# Patient Record
Sex: MALE | Race: WHITE | NOT HISPANIC OR LATINO | URBAN - METROPOLITAN AREA
[De-identification: names, ages, dates, MRNs, and addresses within clinical notes are randomized per-mention and may not be internally consistent; named-entity substitution may affect disease eponyms.]

---

## 2024-03-19 ENCOUNTER — EMERGENCY (EMERGENCY)
Facility: HOSPITAL | Age: 74
LOS: 1 days | Discharge: HOME | End: 2024-03-19
Attending: EMERGENCY MEDICINE | Admitting: EMERGENCY MEDICINE
Payer: COMMERCIAL

## 2024-03-19 VITALS
HEART RATE: 65 BPM | WEIGHT: 190.04 LBS | SYSTOLIC BLOOD PRESSURE: 111 MMHG | DIASTOLIC BLOOD PRESSURE: 60 MMHG | OXYGEN SATURATION: 96 % | TEMPERATURE: 98 F | HEIGHT: 64 IN | RESPIRATION RATE: 18 BRPM

## 2024-03-19 PROCEDURE — 99284 EMERGENCY DEPT VISIT MOD MDM: CPT

## 2024-03-19 PROCEDURE — 96372 THER/PROPH/DIAG INJ SC/IM: CPT

## 2024-03-19 PROCEDURE — 73502 X-RAY EXAM HIP UNI 2-3 VIEWS: CPT | Mod: 26,RT

## 2024-03-19 PROCEDURE — 73502 X-RAY EXAM HIP UNI 2-3 VIEWS: CPT

## 2024-03-19 RX ORDER — IBUPROFEN 200 MG
1 TABLET ORAL
Qty: 21 | Refills: 0
Start: 2024-03-19 | End: 2024-03-25

## 2024-03-19 RX ORDER — ERYTHROMYCIN BASE 5 MG/GRAM
1 OINTMENT (GRAM) OPHTHALMIC (EYE)
Qty: 1 | Refills: 1
Start: 2024-03-19 | End: 2024-04-01

## 2024-03-19 RX ORDER — METHOCARBAMOL 500 MG/1
750 TABLET, FILM COATED ORAL ONCE
Refills: 0 | Status: COMPLETED | OUTPATIENT
Start: 2024-03-19 | End: 2024-03-19

## 2024-03-19 RX ORDER — KETOROLAC TROMETHAMINE 30 MG/ML
30 SYRINGE (ML) INJECTION ONCE
Refills: 0 | Status: DISCONTINUED | OUTPATIENT
Start: 2024-03-19 | End: 2024-03-19

## 2024-03-19 RX ORDER — LIDOCAINE 4 G/100G
1 CREAM TOPICAL ONCE
Refills: 0 | Status: COMPLETED | OUTPATIENT
Start: 2024-03-19 | End: 2024-03-19

## 2024-03-19 RX ORDER — ERYTHROMYCIN BASE 5 MG/GRAM
1 OINTMENT (GRAM) OPHTHALMIC (EYE) ONCE
Refills: 0 | Status: COMPLETED | OUTPATIENT
Start: 2024-03-19 | End: 2024-03-19

## 2024-03-19 RX ORDER — LIDOCAINE 4 G/100G
1 CREAM TOPICAL
Qty: 3 | Refills: 0
Start: 2024-03-19 | End: 2024-03-23

## 2024-03-19 RX ORDER — METHOCARBAMOL 500 MG/1
1 TABLET, FILM COATED ORAL
Qty: 15 | Refills: 0
Start: 2024-03-19 | End: 2024-03-23

## 2024-03-19 RX ADMIN — LIDOCAINE 1 PATCH: 4 CREAM TOPICAL at 18:43

## 2024-03-19 RX ADMIN — Medication 30 MILLIGRAM(S): at 18:42

## 2024-03-19 RX ADMIN — METHOCARBAMOL 750 MILLIGRAM(S): 500 TABLET, FILM COATED ORAL at 18:43

## 2024-03-19 RX ADMIN — Medication 1 APPLICATION(S): at 18:43

## 2024-03-19 NOTE — ED PROVIDER NOTE - OBJECTIVE STATEMENT
73-year-old male presents to the emergency department reporting right-sided buttock pain with radiation to the posterior thigh.  Atraumatic.  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  He states that with movement, the pain is worst.  Took Tylenol this morning and again at noon.  Patient states it is minimal relief.  He applied Bengay with minimal relief.  No abdominal pain.  No nausea vomiting.  Normal bowel movements.  No fevers or chills.

## 2024-03-19 NOTE — ED PROVIDER NOTE - CLINICAL SUMMARY MEDICAL DECISION MAKING FREE TEXT BOX
73-year-old male presents to the emergency department reporting right-sided buttock pain with radiation to the posterior thigh.  Atraumatic.  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  He states that with movement, the pain is worst.  Took Tylenol this morning and again at noon.  Patient states it is minimal relief.  He applied Bengay with minimal relief.  No abdominal pain.  No nausea vomiting.  Normal bowel movements.  No fevers or chills.    Since this is new onset pain for the patient, will obtain x-ray for screening purposes.  Will provide medications for pain.  Patient is driving.  Will give Robaxin ibuprofen and a lidocaine patch.  Will DC with medications and perhaps a stronger medication in case patient needs for home use.  Will give precautions to not drive if taking the stronger pain medication.  Also will advised to use erythromycin eye ointment and warm compresses to the right upper eyelid.      Prelim x-ray is normal.  All questions answered.

## 2024-03-19 NOTE — ED PROVIDER NOTE - NSFOLLOWUPINSTRUCTIONS_ED_ALL_ED_FT
In the Emergency Department today, we did not appreciate any abnormal findings on your xray. We will submit to our radiologist for FINAL review. If there are any new findings or concerning findings that were missed in the Emergency Department, we will notify you at the number provided upon registration.     Please take Medicines as prescribed.  Please take ibuprofen 600 mg every 6 hours as needed with food for pain.  Take acetaminophen 650 mg every 6 hours as needed for pain.  Apply lidocaine patch 4% to the area of pain.  You may change this patch every 8 hours as needed.  If pain is very severe and these medications are not helping, for his very severe pain, we have prescribed a stronger pain medication.  This is to be used only if needed for severe pain.  Please no driving or operating heavy machinery when taking this medication.  It is called Percocet also known as oxycodone with acetaminophen.  1 tablet every 6 hours as needed.  No more than 4 tablets in 24 hours.    Follow-up with your primary medical doctor within 1-2 days.  Please follow-up with an orthopedist as well.    Back pain is very common in adults. The cause of back pain is rarely dangerous and the pain often gets better over time. The cause of your back pain may not be known and may include strain of muscles or ligaments, degeneration of the spinal disks, or arthritis. Occasionally the pain may radiate down your leg(s). Over-the-counter medicines to reduce pain and inflammation are often the most helpful. Stretching and remaining active frequently helps the healing process.     SEEK IMMEDIATE MEDICAL CARE IF YOU HAVE ANY OF THE FOLLOWING SYMPTOMS: bowel or bladder control problems, unusual weakness or numbness in your arms or legs, nausea or vomiting, abdominal pain, fever, dizziness/lightheadedness.      Hoy en el Departamento de Emergencias, no apreciamos ningún hallazgo anormal en rivera radiografía. Lo enviaremos a nuestro radiólogo para ton revisión FINAL. Si hay algún hallazgo nuevo o algún hallazgo preocupante que se haya pasado por alto en el Departamento de Emergencias, se lo notificaremos al número proporcionado al registrarse.    [place] los medicamentos según lo recetado. [place] ibuprofeno 600 mg cada 6 horas según sea necesario con alimentos para el dolor. [place] acetaminofén 650 mg cada 6 horas según sea necesario para el dolor. Aplique un parche de lidocaína al 4% en el área del dolor. Puede cambiar mallika parche cada 8 horas según sea necesario. Si el dolor es muy intenso y estos medicamentos no le ayudan, para rivera dolor muy intenso le hemos recetado un analgésico más eleazar. [place] debe usarse sólo si es necesario para el dolor intenso. No conduzca ni opere maquinaria pesada mientras esté tomando mallika medicamento. Se llama Percocet, también conocido leanna oxicodona con paracetamol. 1 comprimido cada 6 horas según sea necesario. No más de 4 comprimidos en 24 horas.    [place] un seguimiento con rivera médico de cabecera dentro de 1 a 2 días. Por favor claudia un seguimiento también con un ortopedista.    El dolor de espalda es muy común en los adultos. La causa del dolor de espalda dougie vez es peligrosa y el dolor suele mejorar con el tiempo. Es posible que se desconozca la causa de rivera dolor de espalda y puede incluir distensión de músculos o ligamentos, degeneración de los discos espinales o artritis. Ocasionalmente, el dolor puede irradiarse hacia las piernas. Los medicamentos de venta demetrio para reducir el dolor y la inflamación suelen ser los más útiles. Estirarse y permanecer activo con frecuencia ayuda al proceso de curación.    BUSQUE ATENCIÓN MÉDICA INMEDIATA SI TIENE ALGUNO DE LOS SIGUIENTES SÍNTOMAS: problemas de control de los intestinos o la vejiga, debilidad inusual o entumecimiento en los brazos o las piernas, náuseas o vómitos, dolor abdominal, fiebre, mareos o aturdimiento. In the Emergency Department today, we did not appreciate any abnormal findings on your xray. We will submit to our radiologist for FINAL review. If there are any new findings or concerning findings that were missed in the Emergency Department, we will notify you at the number provided upon registration.     Please take Medicines as prescribed.  Please take ibuprofen 600 mg every 6 hours as needed with food for pain.  Take acetaminophen 650 mg every 6 hours as needed for pain.  Apply lidocaine patch 4% to the area of pain.  You may change this patch every 8 hours as needed.  If pain is very severe and these medications are not helping, for his very severe pain, we have prescribed a stronger pain medication.  This is to be used only if needed for severe pain.  Please no driving or operating heavy machinery when taking this medication.  It is called Percocet also known as oxycodone with acetaminophen.  1 tablet every 6 hours as needed.  No more than 4 tablets in 24 hours.    For the stye, we recommend using the eye ointment we provided.  Apply a small amount to the eyelid every 4 hours for at least 7 days.    Follow-up with your primary medical doctor within 1-2 days.  Please follow-up with an orthopedist as well.    Back pain is very common in adults. The cause of back pain is rarely dangerous and the pain often gets better over time. The cause of your back pain may not be known and may include strain of muscles or ligaments, degeneration of the spinal disks, or arthritis. Occasionally the pain may radiate down your leg(s). Over-the-counter medicines to reduce pain and inflammation are often the most helpful. Stretching and remaining active frequently helps the healing process.     SEEK IMMEDIATE MEDICAL CARE IF YOU HAVE ANY OF THE FOLLOWING SYMPTOMS: bowel or bladder control problems, unusual weakness or numbness in your arms or legs, nausea or vomiting, abdominal pain, fever, dizziness/lightheadedness.      Hoy en el Departamento de Emergencias, no apreciamos ningún hallazgo anormal en rivera radiografía. Lo enviaremos a nuestro radiólogo para ton revisión FINAL. Si hay algún hallazgo nuevo o algún hallazgo preocupante que se haya pasado por alto en el Departamento de Emergencias, se lo notificaremos al número proporcionado al registrarse.    [place] los medicamentos según lo recetado. [place] ibuprofeno 600 mg cada 6 horas según sea necesario con alimentos para el dolor. [place] acetaminofén 650 mg cada 6 horas según sea necesario para el dolor. Aplique un parche de lidocaína al 4% en el área del dolor. Puede cambiar mallika parche cada 8 horas según sea necesario. Si el dolor es muy intenso y estos medicamentos no le ayudan, para rivera dolor muy intenso le hemos recetado un analgésico más eleazar. [place] debe usarse sólo si es necesario para el dolor intenso. No conduzca ni opere maquinaria pesada mientras esté tomando mallika medicamento. Se llama Percocet, también conocido leanna oxicodona con paracetamol. 1 comprimido cada 6 horas según sea necesario. No más de 4 comprimidos en 24 horas.    [place] un seguimiento con rivera médico de cabecera dentro de 1 a 2 días. Por favor claudia un seguimiento también con un ortopedista.    El dolor de espalda es muy común en los adultos. La causa del dolor de espalda dougie vez es peligrosa y el dolor suele mejorar con el tiempo. Es posible que se desconozca la causa de rivera dolor de espalda y puede incluir distensión de músculos o ligamentos, degeneración de los discos espinales o artritis. Ocasionalmente, el dolor puede irradiarse hacia las piernas. Los medicamentos de venta demetrio para reducir el dolor y la inflamación suelen ser los más útiles. Estirarse y permanecer activo con frecuencia ayuda al proceso de curación.    Para el orzuelo, recomendamos utilizar la pomada para ojos que le proporcionamos. Aplicar ton pequeña cantidad en el párpado cada 4 horas noe al menos 7 días.    BUSQUE ATENCIÓN MÉDICA INMEDIATA SI TIENE ALGUNO DE LOS SIGUIENTES SÍNTOMAS: problemas de control de los intestinos o la vejiga, debilidad inusual o entumecimiento en los brazos o las piernas, náuseas o vómitos, dolor abdominal, fiebre, mareos o aturdimiento.

## 2024-03-19 NOTE — ED PROVIDER NOTE - PHYSICAL EXAMINATION
General:     NAD, well-nourished, well-appearing  Eyes: PERRL, white sclera, right upper eyelid stye. Small erythema to the lid. No drainage.   Abd:     +BS, s/nt/nd  Back: Right SI joint tenderness. ROM normal but exacerbates pain w R straight leg raise.   Ext:   no deformities   Skin: no rash  Neuro: AAOx3, no sensory/motor deficits

## 2024-03-19 NOTE — ED PROVIDER NOTE - CARE PROVIDER_API CALL
Ayan López Mao  Orthopaedic Surgery  [id number]  [place] [address]  Phone: [phone]  Fax: [phone]  Follow Up Time:

## 2024-03-19 NOTE — ED ADULT TRIAGE NOTE - CHIEF COMPLAINT QUOTE
Patient came from home with complaint of lower back pain for the past two weeks. Patient denies any recent falls or trauma.

## 2024-03-19 NOTE — ED PROVIDER NOTE - PATIENT PORTAL LINK FT
You can access the FollowMyHealth Patient Portal offered by [facility] by registering at the following website: [email or web address]. By joining [employer]’s FollowMyHealth portal, you will also be able to view your health information using other applications (apps) compatible with our system.

## 2025-03-18 DIAGNOSIS — M25.561 PAIN IN RIGHT KNEE: ICD-10-CM

## 2025-03-18 PROBLEM — Z00.00 ENCOUNTER FOR PREVENTIVE HEALTH EXAMINATION: Status: ACTIVE | Noted: 2025-03-18

## 2025-03-19 ENCOUNTER — APPOINTMENT (OUTPATIENT)
Dept: ORTHOPEDIC SURGERY | Facility: CLINIC | Age: 75
End: 2025-03-19
Payer: MEDICARE

## 2025-03-19 VITALS
HEIGHT: 64 IN | HEART RATE: 70 BPM | SYSTOLIC BLOOD PRESSURE: 150 MMHG | WEIGHT: 210 LBS | DIASTOLIC BLOOD PRESSURE: 84 MMHG | BODY MASS INDEX: 35.85 KG/M2

## 2025-03-19 DIAGNOSIS — M17.11 UNILATERAL PRIMARY OSTEOARTHRITIS, RIGHT KNEE: ICD-10-CM

## 2025-03-19 PROCEDURE — 73564 X-RAY EXAM KNEE 4 OR MORE: CPT | Mod: RT

## 2025-03-19 PROCEDURE — 99204 OFFICE O/P NEW MOD 45 MIN: CPT | Mod: 25

## 2025-03-19 PROCEDURE — 20610 DRAIN/INJ JOINT/BURSA W/O US: CPT | Mod: RT

## 2025-04-15 NOTE — CHART NOTE - NSCHARTNOTEFT_GEN_A_CORE
The patient is a 73y Male complaining of back pain general . A follow up appointment call attempted no answer. 0

## 2025-04-29 ENCOUNTER — APPOINTMENT (OUTPATIENT)
Dept: ORTHOPEDIC SURGERY | Facility: CLINIC | Age: 75
End: 2025-04-29
Payer: MEDICARE

## 2025-04-29 DIAGNOSIS — M17.11 UNILATERAL PRIMARY OSTEOARTHRITIS, RIGHT KNEE: ICD-10-CM

## 2025-04-29 PROCEDURE — G2211 COMPLEX E/M VISIT ADD ON: CPT

## 2025-04-29 PROCEDURE — 99213 OFFICE O/P EST LOW 20 MIN: CPT
